# Patient Record
Sex: FEMALE | Race: WHITE | NOT HISPANIC OR LATINO
[De-identification: names, ages, dates, MRNs, and addresses within clinical notes are randomized per-mention and may not be internally consistent; named-entity substitution may affect disease eponyms.]

---

## 2017-05-03 ENCOUNTER — APPOINTMENT (OUTPATIENT)
Dept: OBGYN | Facility: CLINIC | Age: 47
End: 2017-05-03

## 2017-06-20 ENCOUNTER — APPOINTMENT (OUTPATIENT)
Dept: OBGYN | Facility: CLINIC | Age: 47
End: 2017-06-20

## 2018-09-05 ENCOUNTER — APPOINTMENT (OUTPATIENT)
Dept: OBGYN | Facility: CLINIC | Age: 48
End: 2018-09-05

## 2018-10-16 ENCOUNTER — RESULT REVIEW (OUTPATIENT)
Age: 48
End: 2018-10-16

## 2018-10-17 ENCOUNTER — APPOINTMENT (OUTPATIENT)
Dept: OBGYN | Facility: CLINIC | Age: 48
End: 2018-10-17
Payer: COMMERCIAL

## 2018-10-17 PROCEDURE — 99396 PREV VISIT EST AGE 40-64: CPT

## 2021-01-11 ENCOUNTER — TRANSCRIPTION ENCOUNTER (OUTPATIENT)
Age: 51
End: 2021-01-11

## 2021-02-08 ENCOUNTER — TRANSCRIPTION ENCOUNTER (OUTPATIENT)
Age: 51
End: 2021-02-08

## 2021-06-18 ENCOUNTER — APPOINTMENT (OUTPATIENT)
Dept: OBGYN | Facility: CLINIC | Age: 51
End: 2021-06-18

## 2022-03-30 DIAGNOSIS — Z87.42 PERSONAL HISTORY OF OTHER DISEASES OF THE FEMALE GENITAL TRACT: ICD-10-CM

## 2022-03-30 DIAGNOSIS — Z82.49 FAMILY HISTORY OF ISCHEMIC HEART DISEASE AND OTHER DISEASES OF THE CIRCULATORY SYSTEM: ICD-10-CM

## 2022-03-30 DIAGNOSIS — Z78.9 OTHER SPECIFIED HEALTH STATUS: ICD-10-CM

## 2022-03-30 LAB — CYTOLOGY CVX/VAG DOC THIN PREP: NORMAL

## 2022-05-11 ENCOUNTER — APPOINTMENT (OUTPATIENT)
Dept: OBGYN | Facility: CLINIC | Age: 52
End: 2022-05-11
Payer: COMMERCIAL

## 2022-05-11 VITALS
BODY MASS INDEX: 22.86 KG/M2 | HEART RATE: 67 BPM | HEIGHT: 63 IN | OXYGEN SATURATION: 98 % | RESPIRATION RATE: 16 BRPM | SYSTOLIC BLOOD PRESSURE: 130 MMHG | DIASTOLIC BLOOD PRESSURE: 84 MMHG | WEIGHT: 129 LBS

## 2022-05-11 DIAGNOSIS — R92.2 INCONCLUSIVE MAMMOGRAM: ICD-10-CM

## 2022-05-11 DIAGNOSIS — Z12.39 ENCOUNTER FOR OTHER SCREENING FOR MALIGNANT NEOPLASM OF BREAST: ICD-10-CM

## 2022-05-11 PROCEDURE — 99396 PREV VISIT EST AGE 40-64: CPT

## 2022-05-11 NOTE — HISTORY OF PRESENT ILLNESS
[FreeTextEntry1] : 50 y/o  LMP  presents today for annual exam. She was last seen in 2018. She reports her last period was about a year ago, she is not having hot flashes. Pt said she may have had spotting once in the past year, but it was very light. \par \par ObHx:  x2 (twins - ) C/s x 2, (triplets @ 34.5 wks , luther T21)\par SHx: C/s x2; nasal surgery\par PMHx: HTN\par Medications: fluoxetine, Ambien, propranolol   [PapSmeardate] : 10/2018

## 2022-05-11 NOTE — PLAN
[FreeTextEntry1] : 52 y/o  LMP , annual exam\par \par Annual\par -pap done today\par -rx breast mammo\par -colon screening reviewed\par -f/u PCP for annual and appropriate immunizations\par -rto 1 year.

## 2022-05-11 NOTE — END OF VISIT
[FreeTextEntry3] : I, Ella Varghese, acted as a scribe on behalf for Dr. Lucy Ellis on 05/11/2022.\par \par All medical entries made by the scribe were at my, Dr. Lucy Ellis, direction and personally dictated by me on 05/11/2022. I have reviewed the chart and agree that the record accurately reflects my personal performance of the history, physical exam, assessment, and plan. I have personally directed, reviewed, and agreed with the chart.

## 2022-05-13 LAB — HPV HIGH+LOW RISK DNA PNL CVX: NOT DETECTED

## 2022-05-16 LAB — CYTOLOGY CVX/VAG DOC THIN PREP: NORMAL

## 2022-10-21 ENCOUNTER — APPOINTMENT (OUTPATIENT)
Dept: ORTHOPEDIC SURGERY | Facility: CLINIC | Age: 52
End: 2022-10-21

## 2022-10-21 PROCEDURE — 99203 OFFICE O/P NEW LOW 30 MIN: CPT

## 2022-10-21 PROCEDURE — 73140 X-RAY EXAM OF FINGER(S): CPT | Mod: F1

## 2022-10-21 NOTE — DATA REVIEWED
[FreeTextEntry1] : 3 views of the left index finger were obtained in the office today and independently evaluated without evidence of acute fracture. Mild boutonniere deformity.

## 2022-10-21 NOTE — IMAGING
[de-identified] : Left index finger\par Mild swelling of the PIP joint, TTP\par Finger motion limited by pain/swelling\par SILT\par - Karan test\par wwp

## 2022-10-21 NOTE — HISTORY OF PRESENT ILLNESS
[Sudden] : sudden [5] : 5 [Dull/Aching] : dull/aching [Localized] : localized [Intermittent] : intermittent [Household chores] : household chores [Leisure] : leisure [Work] : work [Rest] : rest [Full time] : Work status: full time [de-identified] : 51 y/o F presenting with left index finger pain. States she tripped on the stairs and landed on the finger on 10/17. Since that time she continues to note pain and swelling in the PIP joint. No n/t. [] : no [FreeTextEntry1] : Left Pointer Finger  [FreeTextEntry5] : Genesis is a 52 year old RHD Female who is here today for Her Left Pointer Finger.\par On Monday she took a fall and landed on her hand.\par Finger has been throbbing. [de-identified] : Over Use  [de-identified] : [employer]

## 2022-10-24 ENCOUNTER — APPOINTMENT (OUTPATIENT)
Dept: ORTHOPEDIC SURGERY | Facility: CLINIC | Age: 52
End: 2022-10-24

## 2022-10-25 ENCOUNTER — FORM ENCOUNTER (OUTPATIENT)
Age: 52
End: 2022-10-25

## 2022-10-26 ENCOUNTER — APPOINTMENT (OUTPATIENT)
Dept: MRI IMAGING | Facility: CLINIC | Age: 52
End: 2022-10-26

## 2022-10-26 PROCEDURE — 73218 MRI UPPER EXTREMITY W/O DYE: CPT | Mod: LT

## 2022-11-04 ENCOUNTER — APPOINTMENT (OUTPATIENT)
Dept: ORTHOPEDIC SURGERY | Facility: CLINIC | Age: 52
End: 2022-11-04

## 2022-11-04 VITALS — WEIGHT: 124 LBS | HEIGHT: 62 IN | BODY MASS INDEX: 22.82 KG/M2

## 2022-11-04 DIAGNOSIS — S63.619A UNSPECIFIED SPRAIN OF UNSPECIFIED FINGER, INITIAL ENCOUNTER: ICD-10-CM

## 2022-11-04 DIAGNOSIS — S63.618S: ICD-10-CM

## 2022-11-04 PROCEDURE — 99213 OFFICE O/P EST LOW 20 MIN: CPT

## 2022-11-04 PROCEDURE — 73140 X-RAY EXAM OF FINGER(S): CPT

## 2022-11-04 NOTE — HISTORY OF PRESENT ILLNESS
[Sudden] : sudden [Dull/Aching] : dull/aching [Localized] : localized [Intermittent] : intermittent [Household chores] : household chores [Leisure] : leisure [Work] : work [Rest] : rest [Full time] : Work status: full time [0] : 0 [de-identified] : 51 y/o F returns for repeat evaluation of left index finger pain after jamming it 10/17. She additionally has questions/ concerns regarding the PIP of her right index finger. She cannot recall injury to the right but notes mild painless deformity and slight hyperextension of the PIP.  No n/t. [] : no [FreeTextEntry1] : Left Pointer Finger  [FreeTextEntry5] : 11/04/2022 Pt states Left Pointer Finger is having discomfort, pt states when using the LT Pointer Finger has pain  \par \par Genesis is a 52 year old RHD Female who is here today for Her Left Pointer Finger.\par On Monday she took a fall and landed on her hand.\par Finger has been throbbing. [de-identified] : Over Use  [de-identified] : [employer]

## 2022-11-04 NOTE — DATA REVIEWED
[FreeTextEntry1] : 3 views of the right index finger were obtained in the office today and independently evaluated without evidence of acute fracture. Moderate radial sided soft tissue swelling. MRI of the left index without acute fracture or tendinopathy. Mild to moderate sprain of radial and ulnar collateral ligaments.

## 2022-11-04 NOTE — IMAGING
[de-identified] : Left index finger\par Mild diffuse swelling of the PIP joint, minimally TTP\par Full painless finger ROM\par SILT\par - Karan test\par wwp\par \par Right index finger\par Mild radial sided swelling of the PIP joint, NTTP\par Full painless AROM\par Stable PIP joint to radial/ulnar stress with firm endpoint\par SILT\par wwp

## 2022-11-04 NOTE — DISCUSSION/SUMMARY
[Medication Risks Reviewed] : Medication risks reviewed [de-identified] : - reviewed findings in depth with patient\par - discussed potential treatment options including r/b/a\par - patient will continue to observe for now\par - continue with finger ROM\par - NSAIDs prn pain\par - f/u prn

## 2023-02-06 PROBLEM — R92.2 DENSE BREASTS: Status: ACTIVE | Noted: 2023-02-06

## 2023-02-06 PROBLEM — Z12.39 ENCOUNTER FOR SCREENING BREAST EXAMINATION: Status: ACTIVE | Noted: 2023-02-06

## 2023-05-18 ENCOUNTER — APPOINTMENT (OUTPATIENT)
Dept: OBGYN | Facility: CLINIC | Age: 53
End: 2023-05-18
Payer: COMMERCIAL

## 2023-05-18 VITALS
DIASTOLIC BLOOD PRESSURE: 90 MMHG | BODY MASS INDEX: 23.74 KG/M2 | WEIGHT: 129 LBS | SYSTOLIC BLOOD PRESSURE: 132 MMHG | HEIGHT: 62 IN

## 2023-05-18 DIAGNOSIS — Z01.419 ENCOUNTER FOR GYNECOLOGICAL EXAMINATION (GENERAL) (ROUTINE) W/OUT ABNORMAL FINDINGS: ICD-10-CM

## 2023-05-18 PROCEDURE — 99396 PREV VISIT EST AGE 40-64: CPT

## 2023-05-18 NOTE — PLAN
[FreeTextEntry1] : 54 yo female pt present for an annual wellness visit\par \par HCM\par -pap done today \par -rx for breast mammo/ sono\par -f/u PCP for annual and appropriate immunizations\par -rto 1 year

## 2023-05-18 NOTE — HISTORY OF PRESENT ILLNESS
[Patient reported mammogram was normal] : Patient reported mammogram was normal [Patient reported PAP Smear was normal] : Patient reported PAP Smear was normal [FreeTextEntry1] : 52 yo female pt present for an annual wellness visit. The pt is well and has no complaints.  \par \par ObHx:  x2 (twins - ) C/s x 2, (triplets @ 34.5 wks , luther T21)\par SHx: C/s x2; nasal surgery\par PMHx: HTN\par Medications: fluoxetine, Ambien, propranolol\par  [Mammogramdate] : 03/23 [TextBox_19] : birads 3 [PapSmeardate] : 05/22

## 2023-05-19 LAB — HPV HIGH+LOW RISK DNA PNL CVX: NOT DETECTED

## 2023-05-22 LAB — CYTOLOGY CVX/VAG DOC THIN PREP: NORMAL

## 2024-05-20 ENCOUNTER — APPOINTMENT (OUTPATIENT)
Dept: OBGYN | Facility: CLINIC | Age: 54
End: 2024-05-20
Payer: COMMERCIAL

## 2024-05-20 VITALS
HEART RATE: 79 BPM | OXYGEN SATURATION: 98 % | HEIGHT: 62 IN | SYSTOLIC BLOOD PRESSURE: 137 MMHG | BODY MASS INDEX: 23 KG/M2 | WEIGHT: 125 LBS | DIASTOLIC BLOOD PRESSURE: 89 MMHG

## 2024-05-20 DIAGNOSIS — Z12.4 ENCOUNTER FOR SCREENING FOR MALIGNANT NEOPLASM OF CERVIX: ICD-10-CM

## 2024-05-20 DIAGNOSIS — Z11.51 ENCOUNTER FOR SCREENING FOR HUMAN PAPILLOMAVIRUS (HPV): ICD-10-CM

## 2024-05-20 PROCEDURE — 99396 PREV VISIT EST AGE 40-64: CPT

## 2024-05-20 NOTE — PLAN
[FreeTextEntry1] : Routine GYN Exam -Discussed and reviewed importance of monthly BSE -Declines STI testing, importance safe sexual practices discussed -Pap/HPV test collected and sent at todays visit -RX for breast sono given to pt with locations and instructions -Osteoporosis prevention; recc. vitd/Calcium supplementation and WBE to maintain bone density; start DEXA >65 -f/u PCP for recommended HCM, vaccinations and CA screening -UTD colon per pt -phq2 score 0  rto 1 yr or sooner as needed.

## 2024-05-20 NOTE — COUNSELING
[Vitamins/Supplements] : vitamins/supplements [Breast Self Exam] : breast self exam [Confidentiality] : confidentiality [STD (testing, results, tx)] : STD (testing, results, tx) [Lab Results] : lab results

## 2024-05-20 NOTE — HISTORY OF PRESENT ILLNESS
[Patient reported mammogram was abnormal] : Patient reported mammogram was abnormal [Patient reported PAP Smear was normal] : Patient reported PAP Smear was normal [Patient reported colonoscopy was normal] : Patient reported colonoscopy was normal [Currently In Menopause] : currently in menopause [Post-Menopause, No Sxs] : post-menopausal, currently without symptoms [Menopause Age: ____] : age at menopause was [unfilled] [Currently Active] : currently active [No] : No [Patient refuses STI testing] : Patient refuses STI testing [FreeTextEntry1] : 53 y/o postmenopausal (age 52) F presents for annual GYN exam. Doing well no complaints, no vasomotor symptoms/PMB/pelvic pain.   ObHx:  x2 (twins - ) C/s x 2, (triplets @ 34.5 wks , luther T21)  SHx: C/s x2; nasal surgery  PMHx: HTN  Medications: fluoxetine, Ambien, Losartan [Mammogramdate] : 1/2024 [TextBox_19] : birads 3, bl breast masses, 6 mo f/u [PapSmeardate] : 5/2023

## 2024-05-21 LAB — HPV HIGH+LOW RISK DNA PNL CVX: NOT DETECTED

## 2024-05-28 LAB — CYTOLOGY CVX/VAG DOC THIN PREP: ABNORMAL

## 2024-06-25 DIAGNOSIS — N63.0 UNSPECIFIED LUMP IN UNSPECIFIED BREAST: ICD-10-CM

## 2024-06-25 DIAGNOSIS — N64.89 OTHER SPECIFIED DISORDERS OF BREAST: ICD-10-CM

## 2024-06-25 DIAGNOSIS — R92.8 OTHER ABNORMAL AND INCONCLUSIVE FINDINGS ON DIAGNOSTIC IMAGING OF BREAST: ICD-10-CM

## 2024-06-27 PROBLEM — N63.0 BREAST MASS: Status: ACTIVE | Noted: 2023-03-14

## 2024-06-27 PROBLEM — N64.89 BREAST ASYMMETRY: Status: ACTIVE | Noted: 2023-02-17

## 2024-06-27 PROBLEM — R92.8 ABNORMAL MAMMOGRAM: Status: ACTIVE | Noted: 2024-01-08

## 2024-07-01 DIAGNOSIS — Z12.31 ENCOUNTER FOR SCREENING MAMMOGRAM FOR MALIGNANT NEOPLASM OF BREAST: ICD-10-CM

## 2025-05-29 ENCOUNTER — APPOINTMENT (OUTPATIENT)
Dept: OBGYN | Facility: CLINIC | Age: 55
End: 2025-05-29
Payer: COMMERCIAL

## 2025-05-29 VITALS
BODY MASS INDEX: 24.11 KG/M2 | WEIGHT: 131 LBS | HEIGHT: 62 IN | DIASTOLIC BLOOD PRESSURE: 90 MMHG | SYSTOLIC BLOOD PRESSURE: 149 MMHG

## 2025-05-29 VITALS — DIASTOLIC BLOOD PRESSURE: 80 MMHG | SYSTOLIC BLOOD PRESSURE: 140 MMHG

## 2025-05-29 DIAGNOSIS — Z11.51 ENCOUNTER FOR SCREENING FOR HUMAN PAPILLOMAVIRUS (HPV): ICD-10-CM

## 2025-05-29 DIAGNOSIS — Z12.4 ENCOUNTER FOR SCREENING FOR MALIGNANT NEOPLASM OF CERVIX: ICD-10-CM

## 2025-05-29 DIAGNOSIS — N95.2 POSTMENOPAUSAL ATROPHIC VAGINITIS: ICD-10-CM

## 2025-05-29 DIAGNOSIS — N89.8 OTHER SPECIFIED NONINFLAMMATORY DISORDERS OF VAGINA: ICD-10-CM

## 2025-05-29 PROCEDURE — 99396 PREV VISIT EST AGE 40-64: CPT

## 2025-05-29 PROCEDURE — 99213 OFFICE O/P EST LOW 20 MIN: CPT | Mod: 25

## 2025-05-29 RX ORDER — ESTRADIOL 0.1 MG/G
0.1 CREAM VAGINAL
Qty: 1 | Refills: 2 | Status: ACTIVE | COMMUNITY
Start: 2025-05-29 | End: 1900-01-01

## 2025-06-02 LAB
BV BACTERIA RRNA VAG QL NAA+PROBE: NOT DETECTED
C GLABRATA RNA VAG QL NAA+PROBE: NOT DETECTED
CANDIDA RRNA VAG QL PROBE: NOT DETECTED
HPV HIGH+LOW RISK DNA PNL CVX: NOT DETECTED
T VAGINALIS RRNA SPEC QL NAA+PROBE: NOT DETECTED

## 2025-06-03 LAB — CYTOLOGY CVX/VAG DOC THIN PREP: NORMAL
